# Patient Record
Sex: FEMALE | URBAN - METROPOLITAN AREA
[De-identification: names, ages, dates, MRNs, and addresses within clinical notes are randomized per-mention and may not be internally consistent; named-entity substitution may affect disease eponyms.]

---

## 2017-09-08 ENCOUNTER — APPOINTMENT (RX ONLY)
Dept: URBAN - METROPOLITAN AREA CLINIC 57 | Facility: CLINIC | Age: 47
Setting detail: DERMATOLOGY
End: 2017-09-08

## 2017-09-08 DIAGNOSIS — L42 PITYRIASIS ROSEA: ICD-10-CM

## 2017-09-08 DIAGNOSIS — L73.8 OTHER SPECIFIED FOLLICULAR DISORDERS: ICD-10-CM

## 2017-09-08 PROBLEM — L70.0 ACNE VULGARIS: Status: ACTIVE | Noted: 2017-09-08

## 2017-09-08 PROCEDURE — ? COUNSELING

## 2017-09-08 PROCEDURE — ? EDUCATIONAL RESOURCES PROVIDED

## 2017-09-08 PROCEDURE — 99202 OFFICE O/P NEW SF 15 MIN: CPT

## 2017-09-08 PROCEDURE — ? BENIGN DESTRUCTION COSMETIC

## 2017-09-08 PROCEDURE — ? TREATMENT REGIMEN

## 2017-09-08 ASSESSMENT — LOCATION ZONE DERM
LOCATION ZONE: TRUNK
LOCATION ZONE: FACE

## 2017-09-08 ASSESSMENT — LOCATION DETAILED DESCRIPTION DERM
LOCATION DETAILED: LEFT CENTRAL MALAR CHEEK
LOCATION DETAILED: INFERIOR THORACIC SPINE
LOCATION DETAILED: RIGHT BUTTOCK

## 2017-09-08 ASSESSMENT — LOCATION SIMPLE DESCRIPTION DERM
LOCATION SIMPLE: LEFT CHEEK
LOCATION SIMPLE: RIGHT BUTTOCK
LOCATION SIMPLE: UPPER BACK

## 2017-09-08 NOTE — PROCEDURE: BENIGN DESTRUCTION COSMETIC
Post-Care Instructions: I reviewed with the patient in detail post-care instructions. Patient is to wear sunprotection, and avoid picking at any of the treated lesions. Pt may apply Vaseline to crusted or scabbing areas.
Price (Use Numbers Only, No Special Characters Or $): 50
Anesthesia Volume In Cc: 0
Detail Level: Zone
Consent: The patient's consent was obtained including but not limited to risks of crusting, scabbing, blistering, scarring, darker or lighter pigmentary change, recurrence, incomplete removal and infection.

## 2017-09-08 NOTE — HPI: RASH
How Severe Is Your Rash?: moderate
Is This A New Presentation, Or A Follow-Up?: Rash
Additional History: Pt states that the rash started as a quarter sized lesion on her right buttock. Pt has been using HC 1% cream. Pt saw PCP who prescribed TAC 0.1% topical cream. PCP diagnosed rash as pityriasis rosea. Pt denies itchiness. Pt states she has been under stress for the past couple of months.

## 2017-09-08 NOTE — HPI: SKIN LESIONS
Is This A New Presentation, Or A Follow-Up?: Skin Lesions
How Severe Is Your Skin Lesion?: moderate
Have Your Skin Lesions Been Treated?: not been treated
Additional History: Pt would like lesions removed today.

## 2017-09-08 NOTE — PROCEDURE: TREATMENT REGIMEN
Detail Level: Zone
Continue Regimen: TAC 0.1% topical cream PRN if rash begins to itch
Plan: Discussed that sun exposure can help with the rash. Discussed NBUVB treatment in office. Pt defers starting NBUVB today.

## 2017-10-18 ENCOUNTER — APPOINTMENT (RX ONLY)
Dept: URBAN - METROPOLITAN AREA CLINIC 57 | Facility: CLINIC | Age: 47
Setting detail: DERMATOLOGY
End: 2017-10-18

## 2017-10-18 ENCOUNTER — RX ONLY (OUTPATIENT)
Age: 47
Setting detail: RX ONLY
End: 2017-10-18

## 2017-10-18 DIAGNOSIS — R21 RASH AND OTHER NONSPECIFIC SKIN ERUPTION: ICD-10-CM | Status: INADEQUATELY CONTROLLED

## 2017-10-18 PROCEDURE — 99213 OFFICE O/P EST LOW 20 MIN: CPT | Mod: 25

## 2017-10-18 PROCEDURE — ? COUNSELING

## 2017-10-18 PROCEDURE — ? SEPARATE AND IDENTIFIABLE DOCUMENTATION

## 2017-10-18 PROCEDURE — 11100: CPT

## 2017-10-18 PROCEDURE — ? PRESCRIPTION

## 2017-10-18 PROCEDURE — ? TREATMENT REGIMEN

## 2017-10-18 PROCEDURE — ? BIOPSY BY PUNCH METHOD

## 2017-10-18 RX ORDER — TRIAMCINOLONE ACETONIDE 1 MG/G
0.1% CREAM TOPICAL BID
Qty: 1 | Refills: 0 | Status: ERX

## 2017-10-18 ASSESSMENT — LOCATION SIMPLE DESCRIPTION DERM
LOCATION SIMPLE: RIGHT BUTTOCK
LOCATION SIMPLE: RIGHT FOREARM
LOCATION SIMPLE: LEFT UPPER ARM
LOCATION SIMPLE: LEFT THIGH
LOCATION SIMPLE: LEFT UPPER BACK
LOCATION SIMPLE: RIGHT UPPER ARM
LOCATION SIMPLE: RIGHT THIGH
LOCATION SIMPLE: UPPER BACK
LOCATION SIMPLE: ABDOMEN

## 2017-10-18 ASSESSMENT — LOCATION ZONE DERM
LOCATION ZONE: ARM
LOCATION ZONE: LEG
LOCATION ZONE: TRUNK

## 2017-10-18 ASSESSMENT — LOCATION DETAILED DESCRIPTION DERM
LOCATION DETAILED: EPIGASTRIC SKIN
LOCATION DETAILED: RIGHT BUTTOCK
LOCATION DETAILED: RIGHT VENTRAL PROXIMAL FOREARM
LOCATION DETAILED: LEFT MEDIAL UPPER BACK
LOCATION DETAILED: LEFT ANTERIOR PROXIMAL THIGH
LOCATION DETAILED: RIGHT ANTERIOR DISTAL UPPER ARM
LOCATION DETAILED: LEFT ANTERIOR DISTAL UPPER ARM
LOCATION DETAILED: RIGHT ANTERIOR PROXIMAL THIGH
LOCATION DETAILED: INFERIOR THORACIC SPINE

## 2017-10-18 NOTE — PROCEDURE: TREATMENT REGIMEN
Continue Regimen: TAC 0.1% topical cream PRN if rash begins to itch
Detail Level: Zone
Plan: Discussed considering NBUVB treatment in office depending on bx results.

## 2017-10-18 NOTE — PROCEDURE: BIOPSY BY PUNCH METHOD
Detail Level: Detailed
Consent: Verbal consent was obtained and risks were reviewed including but not limited to scarring, infection, bleeding, scabbing, incomplete removal, nerve damage and allergy to anesthesia.
Billing Type: Third-Party Bill
Post-Care Instructions: I reviewed with the patient in detail post-care instructions. Patient is to keep the biopsy site dry overnight, and then apply bacitracin twice daily until healed. Patient may apply hydrogen peroxide soaks to remove any crusting.
Home Suture Removal Text: Patient was provided a home suture removal kit and will remove their sutures at home.  If they have any questions or difficulties they will call the office.
Bill 23395 For Specimen Handling/Conveyance To Laboratory?: no
Size Of Lesion In Cm (Optional): 0
Hemostasis: Pressure
Punch Size In Mm: 4
Dressing: pressure dressing
Anesthesia Volume In Cc (Will Not Render If 0): 1
Anesthesia Type: 2% Xylocaine with epinephrine and sodium bicarbonate
Suture Removal: 10 days
Biopsy Type: H and E
Lab Facility: [id number]
Wound Care: Vaseline
Notification Instructions: Patient will be notified of biopsy results. However, patient instructed to call the office if not contacted within 2 weeks.
Path Notes (To The Dermatopathologist): 4mm punch
Epidermal Sutures: 4-0 Prolene
Lab: [id number]

## 2017-10-26 ENCOUNTER — APPOINTMENT (RX ONLY)
Dept: URBAN - METROPOLITAN AREA CLINIC 57 | Facility: CLINIC | Age: 47
Setting detail: DERMATOLOGY
End: 2017-10-26

## 2017-10-26 DIAGNOSIS — Z48.02 ENCOUNTER FOR REMOVAL OF SUTURES: ICD-10-CM

## 2017-10-26 PROCEDURE — ? SUTURE REMOVAL (GLOBAL PERIOD)

## 2017-10-26 ASSESSMENT — LOCATION ZONE DERM: LOCATION ZONE: ARM

## 2017-10-26 ASSESSMENT — LOCATION SIMPLE DESCRIPTION DERM: LOCATION SIMPLE: RIGHT FOREARM

## 2017-10-26 ASSESSMENT — LOCATION DETAILED DESCRIPTION DERM: LOCATION DETAILED: RIGHT VENTRAL PROXIMAL FOREARM

## 2017-10-26 NOTE — PROCEDURE: SUTURE REMOVAL (GLOBAL PERIOD)
Detail Level: Detailed
Add 47219 Cpt? (Important Note: In 2017 The Use Of 37723 Is Being Tracked By Cms To Determine Future Global Period Reimbursement For Global Periods): no

## 2017-11-08 ENCOUNTER — RX ONLY (OUTPATIENT)
Age: 47
Setting detail: RX ONLY
End: 2017-11-08

## 2017-11-08 ENCOUNTER — APPOINTMENT (RX ONLY)
Dept: URBAN - METROPOLITAN AREA CLINIC 57 | Facility: CLINIC | Age: 47
Setting detail: DERMATOLOGY
End: 2017-11-08

## 2017-11-08 DIAGNOSIS — L43.8 OTHER LICHEN PLANUS: ICD-10-CM | Status: INADEQUATELY CONTROLLED

## 2017-11-08 PROBLEM — L30.9 DERMATITIS, UNSPECIFIED: Status: ACTIVE | Noted: 2017-11-08

## 2017-11-08 PROCEDURE — ? ORDER TESTS

## 2017-11-08 PROCEDURE — ? EDUCATIONAL RESOURCES PROVIDED

## 2017-11-08 PROCEDURE — 99214 OFFICE O/P EST MOD 30 MIN: CPT

## 2017-11-08 PROCEDURE — ? TREATMENT REGIMEN

## 2017-11-08 PROCEDURE — ? PRESCRIPTION

## 2017-11-08 PROCEDURE — ? COUNSELING

## 2017-11-08 PROCEDURE — ? SEPARATE AND IDENTIFIABLE DOCUMENTATION

## 2017-11-08 RX ORDER — PREDNISONE 10 MG/1
TABLET ORAL QAM
Qty: 70 | Refills: 0 | Status: ERX

## 2017-11-08 ASSESSMENT — LOCATION DETAILED DESCRIPTION DERM
LOCATION DETAILED: LEFT ANTERIOR PROXIMAL THIGH
LOCATION DETAILED: RIGHT ANTERIOR PROXIMAL THIGH
LOCATION DETAILED: LEFT SUPERIOR UPPER BACK
LOCATION DETAILED: LEFT VENTRAL PROXIMAL FOREARM
LOCATION DETAILED: LEFT LATERAL ABDOMEN
LOCATION DETAILED: RIGHT VENTRAL PROXIMAL FOREARM
LOCATION DETAILED: RIGHT LATERAL SUPERIOR CHEST
LOCATION DETAILED: RIGHT LATERAL ABDOMEN
LOCATION DETAILED: LEFT MEDIAL SUPERIOR CHEST
LOCATION DETAILED: RIGHT SUPERIOR UPPER BACK

## 2017-11-08 ASSESSMENT — LOCATION SIMPLE DESCRIPTION DERM
LOCATION SIMPLE: ABDOMEN
LOCATION SIMPLE: LEFT UPPER BACK
LOCATION SIMPLE: RIGHT UPPER BACK
LOCATION SIMPLE: CHEST
LOCATION SIMPLE: RIGHT THIGH
LOCATION SIMPLE: LEFT THIGH
LOCATION SIMPLE: LEFT FOREARM
LOCATION SIMPLE: RIGHT FOREARM

## 2017-11-08 ASSESSMENT — LOCATION ZONE DERM
LOCATION ZONE: ARM
LOCATION ZONE: LEG
LOCATION ZONE: TRUNK

## 2017-11-08 NOTE — PROCEDURE: TREATMENT REGIMEN
Plan: Discussed with Pt to avoid taking NSAID drugs (i.e. Alieve, ibuprofen, etc.). Discussed with Pt that she can try a pulsed dye laser after the inflammation has gone down. Discussed with Pt to wear protective clothing when going to the beach. Lab slip given to Pt to take to [facility] for HepC test.
Detail Level: Zone
Continue Regimen: TAC 0.1% topical cream PRN if rash begins to itch for 1 week on and 1 week off.
Initiate Treatment: Prednisone 10mg taper- 40mg Qam x 1 week, 30mg Qam x 1 week, 20mg Qam x 1 week, 10mg Qam x 1 week

## 2017-11-14 RX ORDER — TRIAMCINOLONE ACETONIDE 1 MG/G
CREAM TOPICAL BID
Qty: 1 | Refills: 0 | Status: ERX

## 2018-03-22 ENCOUNTER — APPOINTMENT (RX ONLY)
Dept: URBAN - METROPOLITAN AREA CLINIC 57 | Facility: CLINIC | Age: 48
Setting detail: DERMATOLOGY
End: 2018-03-22

## 2018-03-22 DIAGNOSIS — L43.8 OTHER LICHEN PLANUS: ICD-10-CM | Status: RESOLVING

## 2018-03-22 DIAGNOSIS — L98.8 OTHER SPECIFIED DISORDERS OF THE SKIN AND SUBCUTANEOUS TISSUE: ICD-10-CM

## 2018-03-22 PROBLEM — L30.9 DERMATITIS, UNSPECIFIED: Status: ACTIVE | Noted: 2018-03-22

## 2018-03-22 PROCEDURE — ? MEDICAL CONSULTATION: DYNAMIC RHYTIDES

## 2018-03-22 PROCEDURE — ? COUNSELING

## 2018-03-22 PROCEDURE — ? TREATMENT REGIMEN

## 2018-03-22 PROCEDURE — 99213 OFFICE O/P EST LOW 20 MIN: CPT

## 2018-03-22 ASSESSMENT — LOCATION DETAILED DESCRIPTION DERM
LOCATION DETAILED: LEFT DISTAL POSTERIOR THIGH
LOCATION DETAILED: RIGHT DISTAL POSTERIOR THIGH
LOCATION DETAILED: RIGHT LATERAL SUPERIOR CHEST
LOCATION DETAILED: LEFT LATERAL SUPERIOR CHEST
LOCATION DETAILED: SUPERIOR LUMBAR SPINE
LOCATION DETAILED: LEFT MEDIAL FOREHEAD
LOCATION DETAILED: MID POSTERIOR NECK
LOCATION DETAILED: RIGHT VENTRAL DISTAL FOREARM

## 2018-03-22 ASSESSMENT — LOCATION SIMPLE DESCRIPTION DERM
LOCATION SIMPLE: LEFT POSTERIOR THIGH
LOCATION SIMPLE: RIGHT POSTERIOR THIGH
LOCATION SIMPLE: LEFT FOREHEAD
LOCATION SIMPLE: RIGHT FOREARM
LOCATION SIMPLE: LOWER BACK
LOCATION SIMPLE: CHEST
LOCATION SIMPLE: POSTERIOR NECK

## 2018-03-22 ASSESSMENT — LOCATION ZONE DERM
LOCATION ZONE: TRUNK
LOCATION ZONE: LEG
LOCATION ZONE: FACE
LOCATION ZONE: ARM
LOCATION ZONE: NECK

## 2018-03-22 NOTE — PROCEDURE: TREATMENT REGIMEN
Detail Level: Simple
Plan: Discussed considering laser treatments to correct hyperpigmentation. Will discuss laser treatment options with other providers in [facility]
Initiate Treatment: TAC 0.1% cream bid prn flares to active lesions on posterior neck, Hydroquinone 6%/tretinoin 0.025%/fluocinolone 0.01% QHS as tolerated (prescription faxed to [facility])